# Patient Record
Sex: FEMALE | Race: WHITE
[De-identification: names, ages, dates, MRNs, and addresses within clinical notes are randomized per-mention and may not be internally consistent; named-entity substitution may affect disease eponyms.]

---

## 2019-04-28 ENCOUNTER — HOSPITAL ENCOUNTER (INPATIENT)
Dept: HOSPITAL 97 - ER | Age: 74
LOS: 1 days | Discharge: HOME | DRG: 291 | End: 2019-04-29
Attending: FAMILY MEDICINE | Admitting: HOSPITALIST
Payer: COMMERCIAL

## 2019-04-28 DIAGNOSIS — I11.0: Primary | ICD-10-CM

## 2019-04-28 DIAGNOSIS — R06.03: ICD-10-CM

## 2019-04-28 DIAGNOSIS — I50.21: ICD-10-CM

## 2019-04-28 DIAGNOSIS — F17.210: ICD-10-CM

## 2019-04-28 DIAGNOSIS — E78.5: ICD-10-CM

## 2019-04-28 DIAGNOSIS — D72.820: ICD-10-CM

## 2019-04-28 LAB
ALBUMIN SERPL BCP-MCNC: 3.6 G/DL (ref 3.4–5)
ALP SERPL-CCNC: 122 U/L (ref 45–117)
ALT SERPL W P-5'-P-CCNC: 33 U/L (ref 12–78)
AST SERPL W P-5'-P-CCNC: 44 U/L (ref 15–37)
BUN BLD-MCNC: 24 MG/DL (ref 7–18)
BURR CELLS BLD QL SMEAR: (no result)
COHGB MFR BLDA: 1.5 % (ref 0–1.5)
GLUCOSE SERPLBLD-MCNC: 372 MG/DL (ref 74–106)
HCT VFR BLD CALC: 37.8 % (ref 36–45)
INR BLD: 0.94
LYMPHOCYTES # SPEC AUTO: 9.6 K/UL (ref 0.7–4.9)
MAGNESIUM SERPL-MCNC: 2.4 MG/DL (ref 1.8–2.4)
MORPHOLOGY BLD-IMP: (no result)
OVALOCYTES BLD QL SMEAR: (no result)
OXYHGB MFR BLDA: 95.8 % (ref 94–97)
PMV BLD: 11.1 FL (ref 7.6–11.3)
POTASSIUM SERPL-SCNC: 3.9 MMOL/L (ref 3.5–5.1)
RBC # BLD: 4.02 M/UL (ref 3.86–4.86)
SAO2 % BLDA: 97.9 % (ref 92–98.5)
TROPONIN (EMERG DEPT USE ONLY): 0.22 NG/ML (ref 0–0.04)
UA COMPLETE W REFLEX CULTURE PNL UR: (no result)

## 2019-04-28 PROCEDURE — 87088 URINE BACTERIA CULTURE: CPT

## 2019-04-28 PROCEDURE — 36415 COLL VENOUS BLD VENIPUNCTURE: CPT

## 2019-04-28 PROCEDURE — 94660 CPAP INITIATION&MGMT: CPT

## 2019-04-28 PROCEDURE — 71250 CT THORAX DX C-: CPT

## 2019-04-28 PROCEDURE — 85025 COMPLETE CBC W/AUTO DIFF WBC: CPT

## 2019-04-28 PROCEDURE — 96365 THER/PROPH/DIAG IV INF INIT: CPT

## 2019-04-28 PROCEDURE — 80061 LIPID PANEL: CPT

## 2019-04-28 PROCEDURE — 80048 BASIC METABOLIC PNL TOTAL CA: CPT

## 2019-04-28 PROCEDURE — 94640 AIRWAY INHALATION TREATMENT: CPT

## 2019-04-28 PROCEDURE — 85730 THROMBOPLASTIN TIME PARTIAL: CPT

## 2019-04-28 PROCEDURE — 96375 TX/PRO/DX INJ NEW DRUG ADDON: CPT

## 2019-04-28 PROCEDURE — 84145 PROCALCITONIN (PCT): CPT

## 2019-04-28 PROCEDURE — 80076 HEPATIC FUNCTION PANEL: CPT

## 2019-04-28 PROCEDURE — 85610 PROTHROMBIN TIME: CPT

## 2019-04-28 PROCEDURE — 84100 ASSAY OF PHOSPHORUS: CPT

## 2019-04-28 PROCEDURE — 80053 COMPREHEN METABOLIC PANEL: CPT

## 2019-04-28 PROCEDURE — 87040 BLOOD CULTURE FOR BACTERIA: CPT

## 2019-04-28 PROCEDURE — 96372 THER/PROPH/DIAG INJ SC/IM: CPT

## 2019-04-28 PROCEDURE — 99285 EMERGENCY DEPT VISIT HI MDM: CPT

## 2019-04-28 PROCEDURE — 93306 TTE W/DOPPLER COMPLETE: CPT

## 2019-04-28 PROCEDURE — 83605 ASSAY OF LACTIC ACID: CPT

## 2019-04-28 PROCEDURE — 84484 ASSAY OF TROPONIN QUANT: CPT

## 2019-04-28 PROCEDURE — 82805 BLOOD GASES W/O2 SATURATION: CPT

## 2019-04-28 PROCEDURE — 81015 MICROSCOPIC EXAM OF URINE: CPT

## 2019-04-28 PROCEDURE — 83880 ASSAY OF NATRIURETIC PEPTIDE: CPT

## 2019-04-28 PROCEDURE — 71045 X-RAY EXAM CHEST 1 VIEW: CPT

## 2019-04-28 PROCEDURE — 87086 URINE CULTURE/COLONY COUNT: CPT

## 2019-04-28 PROCEDURE — 93005 ELECTROCARDIOGRAM TRACING: CPT

## 2019-04-28 PROCEDURE — 83735 ASSAY OF MAGNESIUM: CPT

## 2019-04-28 PROCEDURE — 81003 URINALYSIS AUTO W/O SCOPE: CPT

## 2019-04-28 RX ADMIN — IPRATROPIUM BROMIDE SCH MG: 0.5 SOLUTION RESPIRATORY (INHALATION) at 07:50

## 2019-04-28 RX ADMIN — IPRATROPIUM BROMIDE SCH: 0.5 SOLUTION RESPIRATORY (INHALATION) at 06:00

## 2019-04-28 RX ADMIN — LOSARTAN POTASSIUM SCH MG: 50 TABLET, FILM COATED ORAL at 20:46

## 2019-04-28 RX ADMIN — PIPERACILLIN SODIUM AND TAZOBACTAM SODIUM SCH MLS: 3; .375 INJECTION, POWDER, LYOPHILIZED, FOR SOLUTION INTRAVENOUS at 18:13

## 2019-04-28 RX ADMIN — FUROSEMIDE SCH MG: 10 INJECTION, SOLUTION INTRAVENOUS at 18:14

## 2019-04-28 RX ADMIN — ALBUTEROL SULFATE SCH MG: 2.5 SOLUTION RESPIRATORY (INHALATION) at 14:05

## 2019-04-28 RX ADMIN — FUROSEMIDE SCH MG: 10 INJECTION, SOLUTION INTRAVENOUS at 12:04

## 2019-04-28 RX ADMIN — ALBUTEROL SULFATE SCH: 2.5 SOLUTION RESPIRATORY (INHALATION) at 06:00

## 2019-04-28 RX ADMIN — IPRATROPIUM BROMIDE SCH MG: 0.5 SOLUTION RESPIRATORY (INHALATION) at 20:00

## 2019-04-28 RX ADMIN — LOSARTAN POTASSIUM SCH MG: 50 TABLET, FILM COATED ORAL at 09:33

## 2019-04-28 RX ADMIN — METOPROLOL TARTRATE SCH MG: 25 TABLET ORAL at 18:14

## 2019-04-28 RX ADMIN — ALBUTEROL SULFATE SCH MG: 2.5 SOLUTION RESPIRATORY (INHALATION) at 07:50

## 2019-04-28 RX ADMIN — Medication SCH ML: at 09:34

## 2019-04-28 RX ADMIN — Medication SCH ML: at 20:46

## 2019-04-28 RX ADMIN — PIPERACILLIN SODIUM AND TAZOBACTAM SODIUM SCH MLS: 3; .375 INJECTION, POWDER, LYOPHILIZED, FOR SOLUTION INTRAVENOUS at 09:33

## 2019-04-28 RX ADMIN — IPRATROPIUM BROMIDE SCH MG: 0.5 SOLUTION RESPIRATORY (INHALATION) at 14:05

## 2019-04-28 RX ADMIN — METOPROLOL TARTRATE SCH MG: 25 TABLET ORAL at 09:33

## 2019-04-28 RX ADMIN — ALBUTEROL SULFATE SCH MG: 2.5 SOLUTION RESPIRATORY (INHALATION) at 20:00

## 2019-04-28 NOTE — EDPHYS
Physician Documentation                                                                           

 [facility]                                                                 

Name: Jackie Galarza                                                                            

Age: 73 yrs                                                                                       

Sex: Female                                                                                       

: 1945                                                                                   

MRN: [id number]                                                                                   

Arrival Date: 2019                                                                          

Time: 03:03                                                                                       

Account#: [id number]                                                                            

Bed 3                                                                                             

Private MD:                                                                                       

ED Physician Shade Rashid                                                                      

HPI:                                                                                              

                                                                                             

03:15 This 73 yrs old  Female presents to ER via EMS with complaints of Respiratory  nawaf 

      Distress.                                                                                   

03:15 The patient has shortness of breath at rest, with light activity. Onset: The            nawaf 

      symptoms/episode began/occurred just prior to arrival, today. Duration: The symptoms        

      are continuous, and are steadily getting worse. The patient's shortness of breath is        

      aggravated by coughing, supine position, talking, walking, is alleviated by elevating       

      head, nebulizer treatment, rest, sitting up, application of supplemental oxygen. The        

      patient presents to the emergency department with wheezing, Current therapy: None, that     

      began without any particular precipitating event. Onset: The symptoms/episode               

      began/occurred this morning. The patient has elevated blood pressure and discovered         

      this at home.                                                                               

                                                                                                  

Historical:                                                                                       

- Allergies:                                                                                      

03:12 No Known Allergies;                                                                     fc  

- Home Meds:                                                                                      

04:23 meloxicam 7.5 mg oral tab 1 tab once daily [Active];                                    fc  

- PMHx:                                                                                           

04:23 Arthritis;                                                                              fc  

- PSHx:                                                                                           

03:12 Unable to obtain;                                                                       fc  

                                                                                                  

- Immunization history:: Last tetanus immunization: unknown.                                      

- Social history:: Smoking status: Patient uses tobacco products, smokes one-half pack            

  cigarettes per day, Patient uses alcohol, occasionally. Patient/guardian denies using           

  street drugs.                                                                                   

- Ebola Screening: : Patient negative for fever greater than or equal to 101.5 degrees            

  Fahrenheit, and additional compatible Ebola Virus Disease symptoms Patient denies               

  exposure to infectious person Patient denies travel to an Ebola-affected area in the            

  21 days before illness onset.                                                                   

- Family history:: not pertinent.                                                                 

                                                                                                  

                                                                                                  

ROS:                                                                                              

03:15 Constitutional: Negative for fever, chills, and weight loss, Eyes: Negative for injury, nawaf 

      pain, redness, and discharge, ENT: Negative for injury, pain, and discharge, Neck:          

      Negative for injury, pain, and swelling, Abdomen/GI: Negative for abdominal pain,           

      nausea, vomiting, diarrhea, and constipation, Back: Negative for injury and pain, :       

      Negative for injury, bleeding, discharge, and swelling, MS/Extremity: Negative for          

      injury and deformity, Neuro: Negative for headache, weakness, numbness, tingling, and       

      seizure, Psych: Negative for depression, anxiety, suicide ideation, homicidal ideation,     

      and hallucinations, Allergy/Immunology: Negative for hives, rash, and allergies,            

      Endocrine: Negative for neck swelling, polydipsia, polyuria, polyphagia, and marked         

      weight changes, Hematologic/Lymphatic: Negative for swollen nodes, abnormal bleeding,       

      and unusual bruising.                                                                       

03:15 Cardiovascular: Positive for orthopnea.                                                     

03:15 Respiratory: Positive for cough, shortness of breath, wheezing, expiratory.                 

03:15 Skin: Positive for diaphoresis.                                                             

                                                                                                  

Exam:                                                                                             

03:15 Constitutional:  This is a well developed, well nourished patient who is awake, alert,  nawaf 

      and in no acute distress. Head/Face:  Normocephalic, atraumatic. Eyes:  Pupils equal        

      round and reactive to light, extra-ocular motions intact.  Lids and lashes normal.          

      Conjunctiva and sclera are non-icteric and not injected.  Cornea within normal limits.      

      Periorbital areas with no swelling, redness, or edema. ENT:  Nares patent. No nasal         

      discharge, no septal abnormalities noted.  Tympanic membranes are normal and external       

      auditory canals are clear.  Oropharynx with no redness, swelling, or masses, exudates,      

      or evidence of obstruction, uvula midline.  Mucous membranes moist. Neck:  Trachea          

      midline, no thyromegaly or masses palpated, and no cervical lymphadenopathy.  Supple,       

      full range of motion without nuchal rigidity, or vertebral point tenderness.  No            

      Meningismus. Chest/axilla:  Normal chest wall appearance and motion.  Nontender with no     

      deformity.  No lesions are appreciated. Abdomen/GI:  Soft, non-tender, with normal          

      bowel sounds.  No distension or tympany.  No guarding or rebound.  No evidence of           

      tenderness throughout. Back:  No spinal tenderness.  No costovertebral tenderness.          

      Full range of motion. Female :  Normal external genitalia. Skin:  Warm, dry with          

      normal turgor.  Normal color with no rashes, no lesions, and no evidence of cellulitis.     

      MS/ Extremity:  Pulses equal, no cyanosis.  Neurovascular intact.  Full, normal range       

      of motion. Neuro:  Awake and alert, GCS 15, oriented to person, place, time, and            

      situation.  Cranial nerves II-XII grossly intact.  Motor strength 5/5 in all                

      extremities.  Sensory grossly intact.  Cerebellar exam normal.  Normal gait. Psych:         

      Awake, alert, with orientation to person, place and time.  Behavior, mood, and affect       

      are within normal limits.                                                                   

03:15 Cardiovascular: Rate: tachycardic, Rhythm: regular, Pulses: Pulses are 4+ in bilateral      

      radial, brachial, femoral, popliteal, posterior tibial and and dorsalis pedis               

      arteries.. Heart sounds: normal, Edema: 1+ edema to level of left midcalf and right         

      midcalf, JVD: is noted bilaterally, to 2 cm.                                                

03:15 Musculoskeletal/extremity: DVT Exam: No signs of deep vein thrombosis. no pain, no          

      swelling, no tenderness, negative Homans' sign noted on exam, no appreciated bluish         

      discoloration, no erythema, no increased warmth.                                            

                                                                                                  

Vital Signs:                                                                                      

02:53  / 112; Pulse 120; Resp 36; Temp 98.0(O); Pulse Ox 96% on 100% Non-rebreather     fc  

      mask; Weight 90.72 kg (R); Height 5 ft. 5 in. (165.10 cm) (R); Pain 0/10;                   

03:15  / 102; Pulse 122; Resp 34 S; Pulse Ox 99% on BiPAP;                              jd3 

03:30  / 46; Pulse 111; Resp 24 S; Pulse Ox 97% on BiPAP;                               jd3 

03:45 BP 97 / 54; Pulse 101; Resp 55 S; Pulse Ox 98% on BiPAP;                                jd3 

04:30  / 54; Pulse 90; Resp 20 S; Pulse Ox 98% on R/A;                                  jd3 

05:39  / 52; Pulse 86; Resp 18 S; Pulse Ox 97% on BiPAP;                                jd3 

06:30  / 61; Pulse 87; Resp 18 S; Pulse Ox 97% on 4 lpm NC;                             jd3 

02:53 Body Mass Index 33.28 (90.72 kg, 165.10 cm)                                               

                                                                                                  

MDM:                                                                                              

03:04 Patient medically screened.                                                             [facility] 

03:18 Data reviewed: vital signs, nurses notes, lab test result(s), EKG, radiologic studies,  nawaf 

      plain films.                                                                                

                                                                                                  

                                                                                             

03:14 Order name: Basic Metabolic Panel; Complete Time: 04:12                                 [facility] 

                                                                                             

03:14 Order name: CBC with Diff                                                               [facility] 

                                                                                             

03:14 Order name: LFT's; Complete Time: 04:12                                                 [facility] 

                                                                                             

03:14 Order name: Magnesium; Complete Time: 04:12                                             [facility] 

                                                                                             

03:14 Order name: NT PRO-BNP; Complete Time: 04:12                                            [facility] 

                                                                                             

03:14 Order name: PT-INR; Complete Time: 04:12                                                [facility] 

                                                                                             

03:14 Order name: Troponin (emerg Dept Use Only); Complete Time: 04:12                        [facility] 

                                                                                             

03:14 Order name: Urine Culture                                                               [facility] 

                                                                                             

03:15 Order name: ABG; Complete Time: 04:55                                                   [facility] 

                                                                                             

03:16 Order name: Blood Culture Adult (2)                                                       

                                                                                             

03:16 Order name: Procalcitonin; Complete Time: 04:55                                           

                                                                                             

03:16 Order name: Lactate; Complete Time: 04:12                                                 

                                                                                             

03:33 Order name: Urine Dipstick--Ancillary (enter results); Complete Time: 04:55             [facility] 

                                                                                             

03:33 Order name: Urine Microscopic Only; Complete Time: 05:04                                [facility] 

                                                                                             

03:54 Order name: Manual Differential                                                         [facility]

                                                                                             

03:54 Order name: Slides for Pathologist Review                                               [facility]

                                                                                             

05:09 Order name: Lactate                                                                     [facility]

                                                                                             

05:09 Order name: CBC with Automated Diff                                                     [facility]

                                                                                             

05:09 Order name: CBC with Automated Diff                                                     [facility]

                                                                                             

05:09 Order name: Comprehensive Metabolic Panel                                               [facility]

                                                                                             

05:09 Order name: Comprehensive Metabolic Panel                                               [facility]

                                                                                             

05:09 Order name: Lactate                                                                     [facility]

                                                                                             

05:09 Order name: Lactate                                                                     [facility]

                                                                                             

05:09 Order name: Lipid Profile                                                               [facility]

                                                                                             

05:09 Order name: Lipid Profile                                                               [facility]

                                                                                             

05:09 Order name: Magnesium                                                                   [facility]

                                                                                             

05:09 Order name: Magnesium                                                                   [facility]

                                                                                             

05:09 Order name: Phosphorus                                                                  [facility]

                                                                                             

05:09 Order name: Phosphorus                                                                  [facility]

                                                                                             

05:09 Order name: NT PRO-BNP                                                                  [facility]

                                                                                             

03:14 Order name: XRAY Chest (1 view)                                                         [facility] 

                                                                                             

03:14 Order name: EKG; Complete Time: 03:15                                                   [facility] 

                                                                                             

03:14 Order name: BIPAP                                                                       [facility] 

                                                                                             

04:28 Order name: CT Chest Wo Con                                                             [facility] 

                                                                                             

05:09 Order name: NT PRO-BNP                                                                  [facility]

                                                                                             

05:09 Order name: Protime (+INR)                                                              [facility]

                                                                                             

05:09 Order name: Protime (+INR)                                                              [facility]

                                                                                             

05:09 Order name: PTT, Activated Partial Thromb                                               [facility]

                                                                                             

05:09 Order name: PTT, Activated Partial Thromb                                               [facility]

                                                                                             

05:09 Order name: Troponin I                                                                  [facility]

                                                                                             

05:09 Order name: Troponin I                                                                  [facility]

                                                                                             

05:09 Order name: Troponin I                                                                  [facility]

                                                                                             

05:22 Order name: Echo with Doppler                                                           [facility]

                                                                                             

05:22 Order name: Echo with Doppler                                                           [facility]

                                                                                             

05:22 Order name: Renal Ultrasound-Complete                                                   [facility]

                                                                                             

05:22 Order name: Renal Ultrasound-Complete                                                   [facility]

                                                                                             

03:14 Order name: Cardiac monitoring; Complete Time: 03:56                                    [facility] 

                                                                                             

03:14 Order name: EKG - Nurse/Tech; Complete Time: 03:56                                      [facility] 

                                                                                             

03:14 Order name: IV Saline Lock; Complete Time: 03:56                                        [facility] 

                                                                                             

03:14 Order name: Labs collected and sent; Complete Time: 03:56                               [facility] 

                                                                                             

03:14 Order name: O2 Per Protocol; Complete Time: 03:57                                       [facility] 

                                                                                             

03:14 Order name: O2 Sat Monitoring; Complete Time: 03:57                                     [facility] 

                                                                                             

03:14 Order name: Briggs; Complete Time: 03:36                                                 [facility] 

                                                                                             

03:14 Order name: Urine Dipstick-Ancillary (obtain specimen); Complete Time: 03:36            [facility] 

                                                                                             

05:09 Order name: CONS Physician Consult                                                      [facility]

                                                                                             

05:09 Order name: Heart Healthy                                                               [facility]

                                                                                                  

Administered Medications:                                                                         

03:05 Drug: Xopenex 3.75 mg Route: Inhalation;                                                jd3 

04:05 Follow up: Response: No adverse reaction                                                jd3 

03:05 Drug: AtroVENT Aerosol 0.5 mg Route: Inhalation;                                        jd3 

04:05 Follow up: Response: No adverse reaction                                                jd3 

03:41 Not Given (Duplicate Order): Lasix 60 mg IVP once                                       nawaf 

03:45 Not Given (Duplicate Order): Nitro-Bid Ointment 2 % 1 inches Transdermal once           nawaf 

03:50 Drug: SOLU-Medrol 125 mg Route: IVP; Site: left antecubital;                            jd3 

04:50 Follow up: Response: No adverse reaction                                                jd3 

03:50 Drug: morphine 2 mg Route: IVP; Site: left antecubital;                                 jd3 

04:50 Follow up: Response: No adverse reaction                                                jd3 

03:50 Drug: Zofran 4 mg Route: IVP; Site: left antecubital;                                   jd3 

04:50 Follow up: Response: No adverse reaction                                                jd3 

03:50 Drug: levofloxacin 500 mg Volume: 100 ml; Route: IVPB; Infused Over: 60 mins; Site:     jd3 

      left antecubital;                                                                           

04:50 Follow up: Response: No adverse reaction; IV Status: Completed infusion; IV Intake:     jd3 

      100ml                                                                                       

03:50 Drug: Lasix 40 mg Route: IVP; Site: left antecubital;                                   jd3 

04:50 Follow up: Response: No adverse reaction                                                jd3 

04:58 Drug: Insulin Regular Human 10 units {Co-Signature: ak1 (Hilda Hargrove RN).} Route: IVP; jd3 

      Site: left antecubital;                                                                     

05:50 Follow up: Response: No adverse reaction                                                jd3 

04:58 Drug: Aspirin 162 mg Route: PO;                                                         jd3 

05:50 Follow up: Response: No adverse reaction                                                jd3 

04:59 Drug: Lovenox 1 mg/kg Route: Sub-Q; Site: abdomen;                                      jd3 

05:30 Follow up: Response: No adverse reaction                                                jd3 

05:00 Drug: Pepcid 20 mg Route: IVP; Site: left antecubital;                                  jd3 

05:30 Follow up: Response: No adverse reaction                                                jd3 

05:37 Drug: Rocephin 1 grams Route: IV; Rate: per protocol; Site: left antecubital;           jd3 

05:50 Follow up: Response: No adverse reaction; IV Status: Completed infusion; IV Intake: 81iopa3 

05:38 CANCELLED (Physician Discretion): Nitro-Bid Ointment 2 % 0.5 inches Transdermal once    jd3 

                                                                                                  

                                                                                                  

Disposition:                                                                                      

19 04:16 Hospitalization ordered by Sophy Shaw for Inpatient Admission. Preliminary     

  diagnosis are Dyspnea, Unspecified combined systolic (congestive) and                           

  diastolic (congestive) heart failure, Acidosis - respiratory, Hypoxemia,                        

  Unspecified kidney failure, Elevated white blood cell count, Type 2 diabetes                    

  mellitus.                                                                                       

- Bed requested for Intensive Care Unit.                                                          

- Status is Inpatient Admission.                                                              jd3 

- Condition is Serious.                                                                           

- Problem is new.                                                                                 

- Symptoms have improved.                                                                         

UTI on Admission? No                                                                              

                                                                                                  

                                                                                                  

                                                                                                  

Signatures:                                                                                       

Dispatcher [employer]                           EDMS                                                 

Lita Quintana RN RN mw Anderson, Corey, MD MD cha Chretien, Felicia, RN RN                                                      

Xiang Marti RN RN   jd3                                                  

Hilda Hargrove RN                              ak1                                                  

                                                                                                  

Corrections: (The following items were deleted from the chart)                                    

04:16 04:16 Hospitalization Ordered by Sophy Shaw MD for Inpatient Admission. Preliminary  [facility] 

      diagnosis is Dyspnea; Unspecified combined systolic (congestive) and diastolic              

      (congestive) heart failure; Acidosis - respiratory; Hypoxemia; Unspecified kidney           

      failure. Bed requested for Intensive Care Unit. Status is Inpatient Admission.              

      Condition is Serious. Problem is new. Symptoms have improved. UTI on Admission? No. nawaf     

04:23 03:12 Home Meds: None;                                                                fc  

04:23 03:12 PMHx: None; [facility]  

04:32 04:16 2019 04:16 Hospitalization Ordered by Sophy Shaw MD for Inpatient        [facility] 

      Admission. Preliminary diagnosis is Dyspnea; Unspecified combined systolic (congestive)     

      and diastolic (congestive) heart failure; Acidosis - respiratory; Hypoxemia;                

      Unspecified kidney failure; Elevated white blood cell count. Bed requested for              

      Intensive Care Unit. Status is Inpatient Admission. Condition is Serious. Problem is        

      new. Symptoms have improved. UTI on Admission? No. nawaf                                      

05:00 04:32 2019 04:16 Hospitalization Ordered by Sophy Shaw MD for Inpatient          

      Admission. Preliminary diagnosis is Dyspnea; Unspecified combined systolic (congestive)     

      and diastolic (congestive) heart failure; Acidosis - respiratory; Hypoxemia;                

      Unspecified kidney failure; Elevated white blood cell count; Type 2 diabetes mellitus.      

      Bed requested for Intensive Care Unit. Status is Inpatient Admission. Condition is          

      Serious. Problem is new. Symptoms have improved. UTI on Admission? No. nawaf                  

05:38 03:45 Nitro-Bid Ointment 2 % 0.5 inches Transdermal once ordered. nawaf                   jd3 

05:38 05:38 Nitro-Bid Ointment 2 % 0.5 inches Transdermal once ordered. jd3                   jd3 

06:51 05:00 2019 04:16 Hospitalization Ordered by Sophy Shaw MD for Inpatient        jd3 

      Admission. Preliminary diagnosis is Dyspnea; Unspecified combined systolic (congestive)     

      and diastolic (congestive) heart failure; Acidosis - respiratory; Hypoxemia;                

      Unspecified kidney failure; Elevated white blood cell count; Type 2 diabetes mellitus.      

      Bed requested for Intensive Care Unit. Status is Inpatient Admission. Condition is          

      Serious. Problem is new. Symptoms have improved. UTI on Admission? No. mw                   

                                                                                                  

**************************************************************************************************

## 2019-04-28 NOTE — RAD REPORT
EXAM DESCRIPTION:  RAD - Chest Single View - 4/28/2019 3:40 am

 

CLINICAL HISTORY:  Cough;Dyspnea

Chest pain.

 

COMPARISON:  No comparisons

 

FINDINGS:  Portable technique limits examination quality.

 

Moderate bilateral pulmonary opacities are present likely representing pulmonary edema or pneumonia. 
Small bilateral pleural effusions. The heart is mildly enlarged in size. No displaced fractures.

## 2019-04-28 NOTE — ER
Nurse's Notes                                                                                     

 [facility]                                                                 

Name: Jackie Galarza                                                                            

Age: 73 yrs                                                                                       

Sex: Female                                                                                       

: 1945                                                                                   

MRN: [id number]                                                                                   

Arrival Date: 2019                                                                          

Time: 03:03                                                                                       

Account#: [id number]                                                                            

Bed 3                                                                                             

Private MD:                                                                                       

Diagnosis: Dyspnea;Unspecified combined systolic (congestive) and diastolic (congestive) heart    

  failure;Acidosis-respiratory;Hypoxemia;Unspecified kidney failure;Elevated white                

  blood cell count;Type 2 diabetes mellitus                                                       

                                                                                                  

Presentation:                                                                                     

                                                                                             

02:53 Presenting complaint: EMS states: that they were toned for resp distress. Pts sats were fc  

      85% on room air upon their arrival. Pt continues to have shortness of breath on NRB         

      upon arrival to ER. Transition of care: patient was not received from another setting       

      of care. Onset of symptoms was 2019 at 01:45. Risk Assessment: Do you want to     

      hurt yourself or someone else? Patient reports no desire to harm self or others.            

      Initial Sepsis Screen: Does the patient meet any 2 criteria? RR > 20 per min. HR > 90       

      bpm. Yes Does the patient have a suspected source of infection? Yes: Productive             

      cough/pneumonia If YES to both, name of provider notified: Shade Rashid MD Care prior     

      to arrival: IV initiated. 18 GA, in the left hand, Oxygen administered. via a               

      non-rebreather mask.                                                                        

02:53 Method Of Arrival: EMS: [facility]                                                      

02:53 Acuity: DHEERAJ 2                                                                           fc  

                                                                                                  

Triage Assessment:                                                                                

03:05 Respiratory: Onset: The symptoms/episode began/occurred today, the patient has moderate jd3 

      shortness of breath.                                                                        

                                                                                                  

Historical:                                                                                       

- Allergies:                                                                                      

03:12 No Known Allergies;                                                                     fc  

- Home Meds:                                                                                      

04:23 meloxicam 7.5 mg oral tab 1 tab once daily [Active];                                    fc  

- PMHx:                                                                                           

04:23 Arthritis;                                                                              fc  

- PSHx:                                                                                           

03:12 Unable to obtain;                                                                       fc  

                                                                                                  

- Immunization history:: Last tetanus immunization: unknown.                                      

- Social history:: Smoking status: Patient uses tobacco products, smokes one-half pack            

  cigarettes per day, Patient uses alcohol, occasionally. Patient/guardian denies using           

  street drugs.                                                                                   

- Ebola Screening: : Patient negative for fever greater than or equal to 101.5 degrees            

  Fahrenheit, and additional compatible Ebola Virus Disease symptoms Patient denies               

  exposure to infectious person Patient denies travel to an Ebola-affected area in the            

  21 days before illness onset.                                                                   

- Family history:: not pertinent.                                                                 

                                                                                                  

                                                                                                  

Screenin:53 Abuse screen: Denies threats or abuse. Nutritional screening: No deficits noted.        fc  

      Tuberculosis screening: No symptoms or risk factors identified. Fall Risk None              

      identified.                                                                                 

                                                                                                  

Assessment:                                                                                       

03:05 General: Appears distressed, uncomfortable, Behavior is cooperative, anxious, restless. jd3 

      Pain: Denies pain. Neuro: Level of Consciousness is awake, alert, obeys commands,           

      Oriented to person, place, time, situation, Appropriate for age. Cardiovascular: Heart      

      tones present Rhythm is sinus tachycardia. Respiratory: Reports shortness of breath at      

      rest Airway is patent Respiratory effort is labored, shallow, Respiratory pattern is        

      symmetrical, tachypnea Breath sounds with crackles bilaterally. GI: No signs and/or         

      symptoms were reported involving the gastrointestinal system. : No signs and/or           

      symptoms were reported regarding the genitourinary system. EENT: No signs and/or            

      symptoms were reported regarding the EENT system. Derm: Skin is intact, Skin is dry,        

      Skin is pale, Skin temperature is cool. Musculoskeletal: Circulation, motion, and           

      sensation intact. Range of motion: intact in all extremities.                               

03:59 Reassessment: Patient appears in no apparent distress at this time. Patient and/or      jd3 

      family updated on plan of care and expected duration. Pain level reassessed. Patient is     

      alert, oriented x 3, equal unlabored respirations, skin warm/dry/pink. pt appears and       

      reports feeling more relaxed and reporting it felt easier to breath.                        

04:30 Reassessment: Patient appears in no apparent distress at this time. No changes from     jd3 

      previously documented assessment. Patient and/or family updated on plan of care and         

      expected duration. Pain level reassessed.                                                   

05:39 Reassessment: Patient appears in no apparent distress at this time. Patient and/or      jd3 

      family updated on plan of care and expected duration. Pain level reassessed. Patient is     

      alert, oriented x 3, equal unlabored respirations, skin warm/dry/pink.                      

06:19 Reassessment: Patient appears in no apparent distress at this time. Patient and/or      jd3 

      family updated on plan of care and expected duration. Pain level reassessed. Patient is     

      alert, oriented x 3, equal unlabored respirations, skin warm/dry/pink. report given to      

      Tyler RN in ICU.                                                                           

06:45 Reassessment: Patient appears in no apparent distress at this time. Patient and/or      jd3 

      family updated on plan of care and expected duration. Pain level reassessed. Patient is     

      alert, oriented x 3, equal unlabored respirations, skin warm/dry/pink.                      

                                                                                                  

Vital Signs:                                                                                      

02:53  / 112; Pulse 120; Resp 36; Temp 98.0(O); Pulse Ox 96% on 100% Non-rebreather     fc  

      mask; Weight 90.72 kg (R); Height 5 ft. 5 in. (165.10 cm) (R); Pain 0/10;                   

03:15  / 102; Pulse 122; Resp 34 S; Pulse Ox 99% on BiPAP;                              jd3 

03:30  / 46; Pulse 111; Resp 24 S; Pulse Ox 97% on BiPAP;                               jd3 

03:45 BP 97 / 54; Pulse 101; Resp 55 S; Pulse Ox 98% on BiPAP;                                jd3 

04:30  / 54; Pulse 90; Resp 20 S; Pulse Ox 98% on R/A;                                  jd3 

05:39  / 52; Pulse 86; Resp 18 S; Pulse Ox 97% on BiPAP;                                jd3 

06:30  / 61; Pulse 87; Resp 18 S; Pulse Ox 97% on 4 lpm NC;                             jd3 

02:53 Body Mass Index 33.28 (90.72 kg, 165.10 cm)                                               

                                                                                                  

ED Course:                                                                                        

02:53 Arm band placed on Patient placed in a hallway bed, on a stretcher.                       

02:53 Patient has correct armband on for positive identification. Placed in gown. Bed in low  fc  

      position. Call light in reach. Side rails up X2. Cardiac monitor on. Pulse ox on. NIBP      

      on.                                                                                         

02:53 Maintain EMS IV. Dressing intact. Good blood return noted. Site clean \T\ dry. Gauge \T\    fc

      site: 18 gauge to left hand.                                                                

03:03 Patient arrived in ED.                                                                  fc  

03:04 Shade Rashid MD is Attending Physician.                                             [facility] 

03:05 Inserted saline lock: 20 gauge in left antecubital area, using aseptic technique. Blood jd3 

      collected.                                                                                  

03:11 Triage completed.                                                                       fc  

03:20 Xiang Marti RN is Primary Nurse.                                                  jd3 

03:36 X-ray completed. Portable x-ray completed in exam room.                                 kp1 

03:38 XRAY Chest (1 view) In Process Unspecified.                                             EDMS

04:04 Notified ED physician of a critical lab result(s). lactate of 6.1.                        

04:14 Sophy Shaw MD is Hospitalizing Provider.                                           nawaf 

06:20 No provider procedures requiring assistance completed. Patient admitted, IV remains in  jd3 

      place.                                                                                      

                                                                                                  

Administered Medications:                                                                         

03:05 Drug: Xopenex 3.75 mg Route: Inhalation;                                                jd3 

04:05 Follow up: Response: No adverse reaction                                                jd3 

03:05 Drug: AtroVENT Aerosol 0.5 mg Route: Inhalation;                                        jd3 

04:05 Follow up: Response: No adverse reaction                                                jd3 

03:41 Not Given (Duplicate Order): Lasix 60 mg IVP once                                       [facility] 

03:45 Not Given (Duplicate Order): Nitro-Bid Ointment 2 % 1 inches Transdermal once           nawaf 

03:50 Drug: SOLU-Medrol 125 mg Route: IVP; Site: left antecubital;                            jd3 

04:50 Follow up: Response: No adverse reaction                                                jd3 

03:50 Drug: morphine 2 mg Route: IVP; Site: left antecubital;                                 jd3 

04:50 Follow up: Response: No adverse reaction                                                jd3 

03:50 Drug: Zofran 4 mg Route: IVP; Site: left antecubital;                                   jd3 

04:50 Follow up: Response: No adverse reaction                                                jd3 

03:50 Drug: levofloxacin 500 mg Volume: 100 ml; Route: IVPB; Infused Over: 60 mins; Site:     jd3 

      left antecubital;                                                                           

04:50 Follow up: Response: No adverse reaction; IV Status: Completed infusion; IV Intake:     jd3 

      100ml                                                                                       

03:50 Drug: Lasix 40 mg Route: IVP; Site: left antecubital;                                   jd3 

04:50 Follow up: Response: No adverse reaction                                                jd3 

04:58 Drug: Insulin Regular Human 10 units {Co-Signature: ak1 (Hilda Hargrove RN).} Route: IVP; jd3 

      Site: left antecubital;                                                                     

05:50 Follow up: Response: No adverse reaction                                                jd3 

04:58 Drug: Aspirin 162 mg Route: PO;                                                         jd3 

05:50 Follow up: Response: No adverse reaction                                                jd3 

04:59 Drug: Lovenox 1 mg/kg Route: Sub-Q; Site: abdomen;                                      jd3 

05:30 Follow up: Response: No adverse reaction                                                jd3 

05:00 Drug: Pepcid 20 mg Route: IVP; Site: left antecubital;                                  jd3 

05:30 Follow up: Response: No adverse reaction                                                jd3 

05:37 Drug: Rocephin 1 grams Route: IV; Rate: per protocol; Site: left antecubital;           jd3 

05:50 Follow up: Response: No adverse reaction; IV Status: Completed infusion; IV Intake: [id number] 

05:38 CANCELLED (Physician Discretion): Nitro-Bid Ointment 2 % 0.5 inches Transdermal once    jd3 

                                                                                                  

                                                                                                  

Intake:                                                                                           

04:50 IV: 100ml; Total: 100ml.                                                                jd3 

05:50 IV: 10ml; Total: 110ml.                                                                 jd3 

                                                                                                  

Outcome:                                                                                          

04:16 Decision to Hospitalize by Provider.                                                    nawaf 

06:20 Admitted to ICU accompanied by nurse, via stretcher, room 01, with oxygen, on monitor,  jd3 

      with chart, Report called to  Tyler FLOWERS                                                     

06:20 Condition: stable                                                                           

06:20 Instructed on the need for admit, Demonstrated understanding of instructions.               

06:51 Patient left the ED.                                                                    jd3 

                                                                                                  

Signatures:                                                                                       

Dispatcher [employer]                           EDMS                                                 

Shade Rashid MD MD cha Chretien, Felicia, RN RN fc Poole, Kathy kp1                                                  

Xiang Marti RN RN   jd3                                                  

Hilda Hargrove RN                              ak1                                                  

                                                                                                  

Corrections: (The following items were deleted from the chart)                                    

04:23 03:12 Home Meds: None; fc                                                               fc  

04:23 03:12 PMHx: None; fc                                                                    fc  

05:39 03:59 Reassessment: Patient and/or family updated on plan of care and expected          jd3 

      duration. Pain level reassessed. pt appears and reports feeling more relaxed and            

      reporting it felt easier to breath. jd3                                                     

05:42 05:39  / 54; Pulse 90bpm; Resp 20bpm; Spontaneous; Pulse Ox 98% RA; jd3           jd3 

                                                                                                  

**************************************************************************************************

## 2019-04-28 NOTE — CON
Date of Consultation:  04/28/2019



Admitted to Dr. Shaw's service on 04/28/2019.  I saw the patient on 04/28/2019.



Reason For Consultation:  Possible congestive heart failure.



History Of Present Illness:  Ms. Galarza is a 73-year-old woman who has no past cardiac history.  She 
does have a history of hypertension, dyslipidemia, arthritis.  She smokes.  She basically had just co
me back from a high school reunion, her 56th, and went to bed and she woke up to go to the restroom. 
 By the time she came back to bed, she could not breathe.  Denied any PND.  She denied any chest pain
.  She denied any nausea, vomiting.  She had some diaphoresis.  Denied any chest pain, fever, chills,
 cough.



Past Medical History:  As stated above.



Allergies:  NONE.



Review of Systems:

Negative.



Social History:  Positive for tobacco.



Family History:  Noncontributory.



Medications At Home:  Include meloxicam and Cozaar.



Physical Examination:

General:  Ms. Galarza had a 40% facemask on.  She was in no acute distress. 

Vital Signs:  Sinus tachycardia.  Her pO2 was 119, pCO2 was 39, pH of 7.30. 

HEENT:  Negative. 

Neck:  Supple without any bruit, lymphadenopathy, JVD, or thyromegaly. 

Chest:  Revealed rales in both bases. 

Cardiac:  Revealed regular rhythm and rate without any murmurs, gallops, or rubs. 

Abdomen:  Benign. 

Extremities:  Revealed no clubbing, cyanosis, or edema.



Diagnostic Data:  Chest x-ray showed congestive heart failure versus bilateral pneumonia.  EKG shows 
sinus tachycardia.  White count was 16,000.  Creatinine is 1.46, glucose was 372.  Troponin is 0.22. 
 BNP is 5023.



Impression And Plan:  

1.Possible new-onset acute systolic congestive heart failure.  This is very hard to establish at thi
s point.  She is on the right treatment with beta-blockers, ACE inhibitors, Lasix.  We will see what 
the echocardiogram shows prior to making any final decisions.  Her troponin and BNP elevation are con
sistent with congestive heart failure.  CAD must be ruled out later.  Once the patient improves, we c
an do a Lexiscan or maybe even a catheterization down the road.  The patient is a patient of [facility] in [place] and I prefer she goes back there for her followup down the road unless she leslie bill chooses to.

2.Possible pneumonia with elevated white count, on antibiotics.

3.History of tobacco use with possible chronic obstructive pulmonary disease exacerbation as well.  
She is on inhalers, but I will prefer patient does not get any steroids at this point just in case we
 are dealing with CHF.  I will discuss the case further with Dr. Shaw.





NB/SEPIDEH

DD:  04/28/2019 08:59:06Voice ID:  [id number]

DT:  04/28/2019 14:16:30Report ID:  [id number]

## 2019-04-28 NOTE — RAD REPORT
EXAM DESCRIPTION:  CT - Thorax Wo Con

 

CLINICAL HISTORY:  Chest pain

chf, pna

 

COMPARISON:  No comparisons

 

FINDINGS:  Moderate bilateral alveolar lung opacities are present with thickening of bilateral interl
obular septa, most compatible with pneumonia or pulmonary edema. Small bilateral pleural effusions ar
e present. No pneumothorax.

 

No axillary, mediastinal or hilar adenopathy. The heart size is upper limit of normal.

 

No concerning bony finding. No gross upper abdominal finding.

 

All CT scans are performed using dose optimization technique as appropriate and may include automated
 exposure control or mA/KV adjustment according to patient size.

 

IMPRESSION:  Moderate alveolar lung opacities are present bilaterally with small pleural effusions.Th
e findings may indicate moderate CHF/volume overload or bilateral pneumonia.

## 2019-04-28 NOTE — EKG
Test Date:    2019-04-28               Test Time:    02:57:28

Technician:                                          

                                                     

MEASUREMENT RESULTS:                                       

Intervals:                                           

Rate:         120                                    

DE:           148                                    

QRSD:         102                                    

QT:           342                                    

QTc:          483                                    

Axis:                                                

P:            73                                     

DE:           148                                    

QRS:          33                                     

T:            25                                     

                                                     

INTERPRETIVE STATEMENTS:                                       

                                                     

Sinus tachycardia

Right atrial enlargement

Nonspecific ST and T wave abnormality

Abnormal ECG

No previous ECG available for comparison



Electronically Signed On 04-28-19 07:46:29 CDT by Armando Lira

## 2019-04-28 NOTE — P.HP
Certification for Inpatient


Patient admitted to: Inpatient


With expected LOS: >2 Midnights


Patient will require the following post-hospital care: None


Practitioner: I am a practitioner with admitting privileges, knowledge of 

patient current condition, hospital course, and medical plan of care.


Services: Services provided to patient in accordance with Admission 

requirements found in Title 42 Section 412.3 of the Code of Federal Regulations





Patient History


Date of Service: 04/28/19


Reason for admission: Respiratory distress


History of Present Illness: 





Patient is a 73-year-old female came to the hospital with respiratory distress. 

[place]ypneic.  Patient came into 

the ER and she was found have malignant hypertension.  Patient has pulmonary 

edema. She was admitted with congestive heart failure exacerbation. 





[facility]  She was not doing anything 

to extravagant, but when she got home she was feeling okay.  She woke up around 

1 o'clock to go to the bathroom when her respiratory issues started.  She woke 

up her friend and had them call 911.  She states she has no medical problems. 

She does smoke 1/2 pack-a-day.  She has never been diagnosed with COPD or any 

cardiac diseases.


Allergies





Unable to Assess Allergy (Unverified 04/28/19 06:08)


 





Home medications list reviewed: Yes





- Past Medical/Surgical History


Past Medical History: Patient denies medical history


Past Surgical History: Patient denies surgical history





- Family History


  ** Father


Family History: Reviewed- Non-Contributory





- Social History


Smoking Status: Light Tobacco smoker (1-9 cigarettes/day)


Alcohol use: No


CD- Drugs: No





Review of Systems


10-point ROS is otherwise unremarkable





Physical Examination





- Vital Signs


Temperature: 97.5 F


Blood Pressure: 113/44


Pulse: 90


Respirations: 29


Pulse Ox (%): 95





- Physical Exam


General: Alert, In no apparent distress, Oriented x3


HEENT: Atraumatic, Normocephalic, PERRLA, Mucous membr. moist/pink


Neck: Supple, 2+ carotid pulse no bruit, JVD not distended, No Thyromegaly, No 

LAD


Respiratory: Crackles/rales


Cardiovascular: Regular rate/rhythm, No murmurs


Gastrointestinal: Soft and benign, Non-distended, No tenderness, No rebound, No 

guarding


Musculoskeletal: No clubbing, No swelling, No contractures


Integumentary: No rashes


Neurological: Normal gait, Normal speech, Normal strength at 5/5 x4 extr, 

Normal tone, Sensation intact, Cranial nerves 3-12 intact





- Studies


Laboratory Data (last 24 hrs)





04/28/19 03:05: PT 11.1, INR 0.94


04/28/19 03:05: WBC 16.6 H, Hgb 12.2, Hct 37.8, Plt Count 321


04/28/19 03:05: Sodium 146 H, Potassium 3.9, BUN 24 H, Creatinine 1.46 H, 

Glucose 372 H, Magnesium 2.4, Total Bilirubin 0.4, AST 44 H, ALT 33, Alkaline 

Phosphatase 122 H








Assessment & Plan





- Problems (Diagnosis)


(1) Respiratory distress


Current Visit: Yes   Status: Acute   





(2) Acute exacerbation of CHF (congestive heart failure)


Current Visit: Yes   Status: Acute   





(3) NARCISO (acute kidney injury)


Current Visit: Yes   Status: Acute   





(4) Lymphocytosis


Current Visit: Yes   Status: Acute   





- Plan





1. Echocardiogram


2. We will start patient on an ARB


3. We will start patient on a Beta blocker


4. Cardiology consultation


5. Aggressive diuresis; strict blood pressure control


6. Strict I's and O's


7. Repeat CXR


8. Daily weights


9. Education regarding diet and treatment of congestive heart failure


10. Tobacco cessation education


11. Nebs and IV steroids can probably be Dc today if her respiratory status is 

stable


12. Hematology follow for lymphocytosis; will need to have pathology review 

peripheral blood smear as well


13. GI and DVT prophylaxis


Discharge Plan: Home


Plan to discharge in: Greater than 2 days





- Advance Directives


Does patient have a Living Will: No


Does patient have a Durable POA for Healthcare: No





- Code Status/Comfort Care


Code Status Assessed: Yes


Code Status: Full Code


Critical Care: No


Time Spent Managing PTS Care (In Minutes): 45

## 2019-04-29 LAB
ALBUMIN SERPL BCP-MCNC: 3.3 G/DL (ref 3.4–5)
ALP SERPL-CCNC: 63 U/L (ref 45–117)
ALT SERPL W P-5'-P-CCNC: 26 U/L (ref 12–78)
AST SERPL W P-5'-P-CCNC: 33 U/L (ref 15–37)
BUN BLD-MCNC: 28 MG/DL (ref 7–18)
GLUCOSE SERPLBLD-MCNC: 139 MG/DL (ref 74–106)
HCT VFR BLD CALC: 31.8 % (ref 36–45)
HDLC SERPL-MCNC: 46 MG/DL (ref 40–60)
INR BLD: 0.99
LDLC SERPL CALC-MCNC: 67 MG/DL (ref ?–130)
LYMPHOCYTES # SPEC AUTO: 1.5 K/UL (ref 0.7–4.9)
MAGNESIUM SERPL-MCNC: 2.1 MG/DL (ref 1.8–2.4)
PMV BLD: 10.9 FL (ref 7.6–11.3)
POTASSIUM SERPL-SCNC: 3.5 MMOL/L (ref 3.5–5.1)
RBC # BLD: 3.51 M/UL (ref 3.86–4.86)

## 2019-04-29 RX ADMIN — ALBUTEROL SULFATE SCH MG: 2.5 SOLUTION RESPIRATORY (INHALATION) at 02:00

## 2019-04-29 RX ADMIN — METOPROLOL TARTRATE SCH MG: 25 TABLET ORAL at 06:01

## 2019-04-29 RX ADMIN — PIPERACILLIN SODIUM AND TAZOBACTAM SODIUM SCH MLS: 3; .375 INJECTION, POWDER, LYOPHILIZED, FOR SOLUTION INTRAVENOUS at 09:03

## 2019-04-29 RX ADMIN — IPRATROPIUM BROMIDE SCH MG: 0.5 SOLUTION RESPIRATORY (INHALATION) at 02:00

## 2019-04-29 RX ADMIN — Medication SCH ML: at 09:04

## 2019-04-29 RX ADMIN — LOSARTAN POTASSIUM SCH MG: 50 TABLET, FILM COATED ORAL at 09:04

## 2019-04-29 RX ADMIN — FUROSEMIDE SCH MG: 10 INJECTION, SOLUTION INTRAVENOUS at 03:55

## 2019-04-29 RX ADMIN — ALBUTEROL SULFATE SCH: 2.5 SOLUTION RESPIRATORY (INHALATION) at 08:00

## 2019-04-29 RX ADMIN — FUROSEMIDE SCH MG: 10 INJECTION, SOLUTION INTRAVENOUS at 11:08

## 2019-04-29 RX ADMIN — PIPERACILLIN SODIUM AND TAZOBACTAM SODIUM SCH MLS: 3; .375 INJECTION, POWDER, LYOPHILIZED, FOR SOLUTION INTRAVENOUS at 01:39

## 2019-04-29 RX ADMIN — IPRATROPIUM BROMIDE SCH: 0.5 SOLUTION RESPIRATORY (INHALATION) at 08:00

## 2019-04-29 NOTE — P.SSS
Patient History


Date of Service: 04/29/19


Reason for admission: Respiratory distress


History of Present Illness: 





Patient is a 73-year-old female came to the hospital with respiratory distress. 

[place]ypneic.  Patient came into 

the ER and she was found have malignant hypertension.  Patient has pulmonary 

edema. She was admitted with congestive heart failure exacerbation. 





She is [place] the [place] with some of her friends.  She was not doing anything 

to extravagant, but when she got home she was feeling okay.  She woke up around 

1 o'clock to go to the bathroom when her respiratory issues started.  She woke 

up her friend and had them call 911.  She states she has no medical problems. 

She does smoke 1/2 pack-a-day.  She has never been diagnosed with COPD or any 

cardiac diseases.





Allergies





No Known Allergies Allergy (Unverified 04/28/19 07:54)


 





Home medications list reviewed: Yes


Home Medications: 








Aspirin Chewable [Aspirin Chewable*] 81 mg PO DAILY 04/28/19 


Atorvastatin Calcium 20 mg PO DAILY 04/28/19 


Fluoxetine HCl 20 mg PO BEDTIME 04/28/19 


Losartan Potassium 50 mg PO BEDTIME 04/28/19 


Furosemide 40 mg PO BID #14 tablet 04/29/19 








- Past Medical/Surgical History


Has patient received pneumonia vaccine in the past: Yes


Diabetic: No


-: arthritis


-: high cholesterol


-: anxiety/stress from work


-: 1/2 ppd smoker


-: left knee problems from arthritis





- Family History


  ** Father


-: Cancer


Notes: prostate ca





  ** Mother


-: Cancer, Other (see notes)


Notes: colon ca.  parkinson's





- Social History


Smoking Status: Current every day smoker


Alcohol use: Yes


CD- Drugs: No


Caffeine use: Yes


Place of Residence: Home





Review of Systems


10-point ROS is otherwise unremarkable





Physical Examination





- Vital Signs


Temperature: 98.6 F


Blood Pressure: 124/59


Pulse: 66


Respirations: 17


Pulse Ox (%): 96





- Physical Exam


General: Alert, In no apparent distress, Oriented x3


HEENT: Atraumatic, PERRLA, Mucous membr. moist/pink, EOMI, Sclerae nonicteric


Neck: Supple, 2+ carotid pulse no bruit, No LAD, Without JVD or thyroid 

abnormality


Respiratory: Clear to auscultation bilaterally, Normal air movement


Cardiovascular: Regular rate/rhythm, Normal S1 S2


Gastrointestinal: Normal bowel sounds, No tenderness


Musculoskeletal: No tenderness


Integumentary: No rashes


Neurological: Normal gait, Normal speech, Normal strength at 5/5 x4 extr, 

Normal tone, Normal affect


Lymphatics: No axilla or inguinal lymphadenopathy


Treatment Summary: 





Patient was admitted for a respiratory distress, possible new heart failure 

diagnosis.  An echocardiogram was ordered.  Cardiology was consulted.  She was 

started on some heart failure guidelines.  She was diuresed with IV Lasix.  Her 

symptoms improved.  Her nebulizer treatments and steroids were discontinued.  

Per patient, she would like outpatient follow up with primary care physician 

and Cardiology.  She is a [facility] patient I would like to return there 

for follow up.


An echocardiogram was done, but patient not want to wait for the results.  She 

was cleared for discharge from cardiology point of view for outpatient follow 

up.


She was discharged on oral Lasix.  Prior to discharge, she was alert oriented x3

, in no acute distress and hemodynamically stable.


For diagnoses/treatment plan explained to patient.  All questions were answered 

and patient verbalized understanding.  She was discharged home in a safe and 

stable manner.


She was in recommended that her primary care physician request records from a 

hospital for echo results etc.


Return to emergency room precautions were given to patient.








- Disposition


Discharge Date: 04/29/19


Disposition: ROUTINE DISCHARGE


Condition: GOOD


Consultations: 





Cardiology, Dr. Rosario





Patient Discharge Instructions: Please follow up with the primary care 

physician in 2-3 days.  Please ask her primary care physician to request 

records from our medical records office.  Please return to the emergency room 

for worsening symptoms


Diet: AHA


Activity: Ad castro


Time Spent Managing Pts Care (In Minutes): 55

## 2019-04-29 NOTE — ECHO
HEIGHT: 5 ft 5 in   WEIGHT: 191 lb 6.4 oz   DATE OF STUDY: 4/29/19   REFER DR: Sophy Shaw MD

2-DIMENSIONAL: YES

     M.MODE: YES

 DOPPLER: YES

COLOR FLOW: YES



                    TDS:  NO

PORTABLE: NO

 DEFINITY:  NO

BUBBLE STUDY: NO





DIAGNOSIS:  CONGESTIVE HEART FAILURE



CARDIAC HISTORY:  

CATHERIZATION: NO

SURGERY: NO

PROSTHETIC VALVE: NO

PACEMAKER: NO





MEASUREMENTS (cm)

    DIASTOLIC (NORMALS)                 SYSTOLIC (NORMALS)

IVSd                 1.3 (0.6-1.2)                    LA Diam 4.1 (1.9-4.0)     LVEF       
  51%  

LVIDd               5.7 (3.5-5.7)                        LVIDs      4.2 (2.0-3.5)     %FS  
        26%

LVPWd             1.2 (0.6-1.2)

Ao Diam           3.1 (2.0-3.7)



2 DIMENSIONAL ASSESSMENT:

RIGHT ATRIUM:                   NORMAL

LEFT ATRIUM:       DILATED



RIGHT VENTRICLE:            NOARMAL

LEFT VENTRICLE: LEFT VENTRICULAR HYPERTROPHY



TRICUSPID VALVE:             NORMAL

MITRAL VALVE:     NORMAL



PULMONIC VALVE:             NORMAL

AORTIC VALVE:     NORMAL



PERICARDIAL EFFUSION: NONE

AORTIC ROOT:      NORMAL





LEFT VENTRICULAR WALL MOTION:     NORMAL.



DOPPLER/COLOR FLOW:     MODERATE MITRAL REGURGITATION. TRACE OF AORTIC AND TRICUSPID 
REGURGITATION. NORMAL RIGHT VENTRICULAR SYSTOLIC PRESSURE.



COMMENTS:      NORMAL LEFT VENTRICULAR EJECTION FRACTION. LEFT VENTRICULAR HYPERTROPHY. 
DILATED LEFT ATRIUM. MODERATE MITRAL REGURGITATION. TRACE OF AORTIC AND TRICUSPID 
REGURGITATION.



TECHNOLOGIST:   RUBINA HANNA